# Patient Record
(demographics unavailable — no encounter records)

---

## 2025-03-07 NOTE — PHYSICAL EXAM
[Normal Jugular Venous A Waves Present] : normal jugular venous A waves present [Normal Jugular Venous V Waves Present] : normal jugular venous V waves present [No Jugular Venous Monzon A Waves] : no jugular venous monzon A waves [Respiration, Rhythm And Depth] : normal respiratory rhythm and effort [Exaggerated Use Of Accessory Muscles For Inspiration] : no accessory muscle use [Auscultation Breath Sounds / Voice Sounds] : lungs were clear to auscultation bilaterally [Abdomen Soft] : soft [Abdomen Tenderness] : non-tender [Abdomen Mass (___ Cm)] : no abdominal mass palpated [Abnormal Walk] : normal gait [Gait - Sufficient For Exercise Testing] : the gait was sufficient for exercise testing [Nail Clubbing] : no clubbing of the fingernails [Cyanosis, Localized] : no localized cyanosis [Petechial Hemorrhages (___cm)] : no petechial hemorrhages [] : no ischemic changes [Well Developed] : well developed [Well Nourished] : well nourished [No Acute Distress] : no acute distress [Normal Conjunctiva] : normal conjunctiva [Normal Venous Pressure] : normal venous pressure [No Carotid Bruit] : no carotid bruit [Normal S1, S2] : normal S1, S2 [No Murmur] : no murmur [No Rub] : no rub [No Gallop] : no gallop [Clear Lung Fields] : clear lung fields [Good Air Entry] : good air entry [No Respiratory Distress] : no respiratory distress  [Soft] : abdomen soft [Non Tender] : non-tender [No Masses/organomegaly] : no masses/organomegaly [Normal Bowel Sounds] : normal bowel sounds [Normal Gait] : normal gait [No Edema] : no edema [No Cyanosis] : no cyanosis [No Clubbing] : no clubbing [No Varicosities] : no varicosities [No Rash] : no rash [No Skin Lesions] : no skin lesions [Moves all extremities] : moves all extremities [No Focal Deficits] : no focal deficits [Normal Speech] : normal speech [Alert and Oriented] : alert and oriented [Normal memory] : normal memory

## 2025-03-10 NOTE — HISTORY OF PRESENT ILLNESS
[FreeTextEntry1] : 61 y/o male admitted with unstable angina and h/o CAD, s/p stents 10 years ago.  Cath revealed severe TVD and an EF 25%. Patient underwent urgent CABG x 4  (LIMA-LAD, SVG-OM,SVG-Diag, SVG-PDA. Pattient tolerated procedure well. Post  operative, patient had an episode of v. tach / a. flutter as well as lactic  acidosis secondary to hypoperfusion/shock. He was  brought back to the cath lab  for insertion of Impella device.  The patient remained intubated for several  days and was seen by EP, who recommended outpatient follow up.  No intervention  was deemed necessary at this time being the arrhthymias resolved.  The patient also had a repeat echo which showed an improved EF of 35-40%.   He underwent a fib ablation on April, 3, 2023, and is feeling well.  Denies chest pain, palpitaitons, dyspnea, dizziness or syncopal episodes.  Patient comes after successfully Cryo AF ablation. Patient is doing great. He states that his shortness of breath has significantly improved since the ablation. Patient can walk distances without dyspnea on exertion.   07/12/2024: Patient comes to the office today for routine follow-up. Patient states that he is feeling very well. He denies any palpitations or shortness of breath.   03/07/2025: Patient comes to the office today for follow-up. He is currently in normal sinus rhythm and denies any palpitations, shortness of breath, or chest pain.     EKG (03/07/2025): Sinus rhythm at 81 bpm, LBBB.    EKG (07/12/2024): Sinus rhythm at 76 bpm, LBBB.  EKG (02/09/2024): Sinus rhythm at 80 bpm, LBBB.    EKG (09/08/2023): Sinus rhythm, LBBB

## 2025-03-10 NOTE — ADDENDUM
[FreeTextEntry1] : Martina HANEY assisted in documentation on 03/10/2025   acting as a scribe for Dr. Matt Andrade.

## 2025-03-10 NOTE — ASSESSMENT
[FreeTextEntry1] : Atrial Fibrillation / Atrial Flutter  - Patient is doing great and is currently in normal sinus rhythm.     - Patient is doing great. Significant improvement in symptoms.   - Continue Eliquis 5 mg Q12.    Congestive Heart Failure  - Continue Losartan 25 mg once a day.  - Continue Metoprolol 50 mg once a day. - Patient's ejection fraction was 35-40% in the past. - Will obtain recent echocardiogram from Dr. Mishra.

## 2025-03-10 NOTE — END OF VISIT
[FreeTextEntry3] :  All medical record entries made by my scribe were at my, Dr. Matt Andrade, direction and personally dictated by me. I have reviewed the chart and agree that the record accurately reflects my personal performance of the history, physical exam, assessment and plan. I have also personally directed, reviewed, and agreed with the chart.